# Patient Record
Sex: FEMALE | URBAN - METROPOLITAN AREA
[De-identification: names, ages, dates, MRNs, and addresses within clinical notes are randomized per-mention and may not be internally consistent; named-entity substitution may affect disease eponyms.]

---

## 2023-05-29 ENCOUNTER — HOSPITAL ENCOUNTER (EMERGENCY)
Facility: MEDICAL CENTER | Age: 53
End: 2023-05-29
Attending: EMERGENCY MEDICINE

## 2023-05-29 VITALS
RESPIRATION RATE: 18 BRPM | OXYGEN SATURATION: 98 % | WEIGHT: 220.9 LBS | TEMPERATURE: 97.9 F | DIASTOLIC BLOOD PRESSURE: 89 MMHG | SYSTOLIC BLOOD PRESSURE: 169 MMHG | HEART RATE: 70 BPM

## 2023-05-29 DIAGNOSIS — R04.0 EPISTAXIS: ICD-10-CM

## 2023-05-29 PROCEDURE — 99282 EMERGENCY DEPT VISIT SF MDM: CPT

## 2023-05-29 RX ORDER — LOSARTAN POTASSIUM 50 MG/1
50 TABLET ORAL DAILY
COMMUNITY

## 2023-05-29 ASSESSMENT — LIFESTYLE VARIABLES
DO YOU DRINK ALCOHOL: NO
DOES PATIENT WANT TO STOP DRINKING: NO

## 2023-05-29 NOTE — DISCHARGE INSTRUCTIONS
If nosebleed returns spray Afrin into both nostrils and apply nasal clamp for 20 minutes.  If this does not stop bleeding return to the emergency department.    Use a humidifier at night and apply nasal ointment to keep the nose moist.  Make sure that you take your blood pressure medication as directed and keep a blood pressure log.

## 2023-05-29 NOTE — ED TRIAGE NOTES
Pt to triage .  Chief Complaint   Patient presents with    Epistaxis     Pt c/o high bp and off/on epistaxis x 2 days     Blood Pressure Problem

## 2023-05-29 NOTE — ED PROVIDER NOTES
ED PHYSICIAN NOTE    CHIEF COMPLAINT  Chief Complaint   Patient presents with    Epistaxis     Pt c/o high bp and off/on epistaxis x 2 days     Blood Pressure Problem     HPI/ROS  LIMITATION TO HISTORY   Select: Language Khmer,  Used   OUTSIDE HISTORIAN(S):  Family reports she tends to lean her head back and the nosebleeds stop    Natividad Colmenares is a 53 y.o. female who presents to the emergency department with intermittent nosebleeds.  Started a couple days ago.  Comes on out of the blue.  No specific trauma.  Usually left nostril bleeding and it bleeds a lot.  No active bleeding currently.  Patient had was recently diagnosed with elevated blood pressure.  She is on 50 mg of Cozaar daily.  She has been compliant with this medication.  She takes her blood pressure every day and write down the numbers but she has no idea what her blood pressure reading was this morning.  She does not have chest pain shortness of breath abdominal pain.  Has not had any bleeding bruising.  No black or bloody stool.  No nausea vomiting.  Denies headaches.    PAST MEDICAL HISTORY  Past Medical History:   Diagnosis Date    Hypertension        SOCIAL HISTORY  Social History     Tobacco Use    Smoking status: Never    Smokeless tobacco: Never   Substance Use Topics    Alcohol use: Never    Drug use: Never       CURRENT MEDICATIONS  Home Medications       Reviewed by Kati Ramirez R.N. (Registered Nurse) on 05/29/23 at 0937  Med List Status: Partial     Medication Last Dose Status   losartan (COZAAR) 50 MG Tab 5/29/2023 Active                    ALLERGIES  Allergies   Allergen Reactions    Penicillins        PHYSICAL EXAM  VITAL SIGNS: BP (!) 177/97   Pulse 74   Temp 36.5 °C (97.7 °F) (Temporal)   Resp 16   Wt 100 kg (220 lb 14.4 oz)   SpO2 97%    Constitutional: Awake and alert  HENT: No abrasions over the nasal septum.  No active bleeding.  No bleeding in the posterior pharynx.  Eyes: Normal  inspection  Neck: Grossly normal range of motion.  Cardiovascular: Normal heart rate, Normal rhythm.  Symmetric peripheral pulses.   Thorax & Lungs: No respiratory distress, No wheezing, No rales, No rhonchi, No chest tenderness.   Abdomen: Bowel sounds normal, soft, non-distended, nontender, no mass  Skin: No obvious rash.  Neurologic: Grossly normal   Psychiatric: Normal for situation         COURSE & MEDICAL DECISION MAKING        INITIAL ASSESSMENT, COURSE AND PLAN  Care Narrative: Patient presents with emergent epistaxis.  There is no active nosebleed.  Her blood pressure is moderately elevated but she states she does not know what her blood pressure was this morning but thinks it was rather low.  For this reason no intervention will be given.  On recheck blood pressure was improved.  She does not have easy bruising bleeding or other suggestion of coagulopathy.  Does not appear anemic.  Not appear to require any emergency testing.  We discussed nosebleed.  If recurrent nosebleeding have advised instill Afrin into both nostrils and apply nasal clamp.  If this does not abort bleeding patient would return to ER.  Advised nasal emolient and humidifier at night to prevent nosebleed.  She will take blood pressure log and follow with her doctor.  Patient voiced understanding of these instructions.  Patient discharged.        DISPOSITION AND DISCUSSIONS    Escalation of care considered, and ultimately not performed: Consider blood work, but given no other easy bruising or bleeding stable vital signs no clinical evidence of anemia and does not appear to be indicated emergently.  Considered treatment hypertension, but patient blood pressure reported low earlier and therefore will hold on this.  Advised home monitoring and recording numbers.    Prescription drugs considered and/or prescribed: Considered additional antihypertensives, but best course would be continued monitor and have primary doctor adjust medications as  needed    FINAL IMPRESSION  1.  Epistaxis  2.  Hypertension    This dictation was created using voice recognition software. The accuracy of the dictation is limited to the abilities of the software. I expect there may be some errors of grammar and possibly content. The nursing notes were reviewed and certain aspects of this information were incorporated into this note.    Electronically signed by: Ernesto Moscoso M.D., 5/29/2023